# Patient Record
Sex: FEMALE | Race: WHITE | HISPANIC OR LATINO | URBAN - METROPOLITAN AREA
[De-identification: names, ages, dates, MRNs, and addresses within clinical notes are randomized per-mention and may not be internally consistent; named-entity substitution may affect disease eponyms.]

---
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
## 2019-04-26 NOTE — ED PROVIDER NOTE - CARE PLAN
Encounter Date: 4/25/2019    SCRIBE #1 NOTE: I, Daphnie Newby, am scribing for, and in the presence of,  KIERRA Velasco. I have scribed the entire note. Other sections scribed: HPI, ROS, PE.       History     Chief Complaint   Patient presents with    Wound Check     pt has splint to left lower leg placed here yest. Pt reports getting it wet at school and needs it re-wrapped     Dede Foy is a 11 y.o. female who presents to the ED for a splint change. Patient was seen in ED for left foot pain and swelling after she tripped and fell while running 4 days ago. She visited the ED yesterday and x-ray showed suspected acute avulsion type fracture seen at the base of the 5th metatarsal. Splint was applied in ED, but today patient got splint wet at school and needs it re-wrapped.    The history is provided by the patient. No  was used.   General Injury    The incident occurred 3 to 5 hours ago (Foot splint got wet a school). The incident occurred at school. The pain is at a severity of 5/10. Pertinent negatives include no chest pain and no numbness. She has been behaving normally. Recently, medical care has been given at this facility.     Review of patient's allergies indicates:   Allergen Reactions    Amoxicillin Hives    Amoxicillin     Pcn [penicillins]      History reviewed. No pertinent past medical history.  Past Surgical History:   Procedure Laterality Date    TONSILLECTOMY-ADENOIDECTOMY (T AND A) Bilateral 8/29/2016    Performed by Mare Hastings MD at [facility] OR [address] [place] [address]     History reviewed. No pertinent family history.  Social History     Tobacco Use    Smoking status: Never Smoker    Smokeless tobacco: Never Used   Substance Use Topics    Alcohol use: No    Drug use: No     Review of Systems   Constitutional: Negative.    HENT: Negative.    Eyes: Negative.    Respiratory: Negative.  Negative for shortness of breath.    Cardiovascular: Negative.  Negative for chest pain.    Gastrointestinal: Negative.    Endocrine: Negative.    Genitourinary: Negative.    Musculoskeletal: Positive for arthralgias (L foot) and joint swelling (L foot).        L foot splinted   Allergic/Immunologic: Negative.    Neurological: Negative.  Negative for numbness.   Hematological: Negative.    Psychiatric/Behavioral: Negative.    All other systems reviewed and are negative.      Physical Exam     Initial Vitals [04/25/19 1758]   BP Pulse Resp Temp SpO2   121/66 95 20 98.9 °F (37.2 °C) 100 %      MAP       --         Physical Exam    Nursing note and vitals reviewed.  Constitutional: She appears well-developed.   HENT:   Head: Normocephalic and atraumatic.   Right Ear: External ear normal.   Left Ear: External ear normal.   Eyes: Conjunctivae are normal.   Neck: Normal range of motion. Neck supple.   Cardiovascular: Normal rate, regular rhythm, normal heart sounds and intact distal pulses.   Pulmonary/Chest: Breath sounds normal. No respiratory distress.   Abdominal: Soft.   Neurological: She is alert and oriented to person, place, and time. GCS score is 15.   Skin: Skin is warm.   Psychiatric: She has a normal mood and affect.         ED Course   Splint Application  Date/Time: 4/25/2019 7:30 PM  Performed by: KIERRA Garcia  Authorized by: Edita Richmond MD   Location details: left leg  Splint type: short leg  Supplies used: Ortho-Glass  Post-procedure: The splinted body part was neurovascularly unchanged following the procedure.  Patient tolerance: Patient tolerated the procedure well with no immediate complications        Labs Reviewed - No data to display       Imaging Results    None          Medical Decision Making:   Initial Assessment:   This is a 11 y.o. nontoxic female who presents to the ED for wound check of L foot s/p splint application yesterday at ED.  Differential Diagnosis:   Splint change  ED Management:  Short-leg splint applied.  Follow-up with ortho as scheduled.  Motrin as needed for  Principal Discharge DX:	Nausea vomiting and diarrhea pain .            Scribe Attestation:   Scribe #1: I performed the above scribed service and the documentation accurately describes the services I performed. I attest to the accuracy of the note.    This document was produced by a scribe under my direction and in my presence. I agree with the content of the note and have made any necessary edits.     KIERRA Velasco    04/27/2019 12:48 PM        ED Course as of Apr 25 2020   Thu Apr 25, 2019   1741 A 11-year-old female who presents to the ED wound for a wound recheck.  Patient had a splint placed here and the splint got wet at school.  Mother is requesting a splint change.    [TA]      ED Course User Index  [TA] KIERRA Garcia     Clinical Impression:     1. Closed fracture of left foot with routine healing, subsequent encounter                                   KIERRA Garcia  04/27/19 1241     Principal Discharge DX:	Nausea vomiting and diarrhea  Instructions for follow-up, activity and diet:	You were seen today for your nausea, vomiting, and diarrhea.  It is likely due to a viral illness.  Eat a bland diet such as bananas, rice, applesauce, and toast until you feel better.  Drink plenty of fluids.  Take the prescribed nausea medication as needed.  Be sure to follow up with your primary care physician in 2-3 days for re-evaluation.  RETURN TO THE EMERGENCY DEPARTMENT IMMEDIATELY IF YOU CANNOT EAT OR DRINK, HAVE SEVERE ABDOMINAL PAIN, OR FOR ANY OTHER CONCERN.

## 2021-06-23 ENCOUNTER — EMERGENCY (EMERGENCY)
Facility: HOSPITAL | Age: 21
LOS: 1 days | Discharge: HOME | End: 2021-06-23
Attending: EMERGENCY MEDICINE | Admitting: HOSPITALIST
Payer: COMMERCIAL

## 2021-06-23 VITALS
DIASTOLIC BLOOD PRESSURE: 61 MMHG | OXYGEN SATURATION: 100 % | HEART RATE: 72 BPM | TEMPERATURE: 98 F | RESPIRATION RATE: 17 BRPM | SYSTOLIC BLOOD PRESSURE: 97 MMHG

## 2021-06-23 VITALS
HEART RATE: 60 BPM | SYSTOLIC BLOOD PRESSURE: 115 MMHG | OXYGEN SATURATION: 100 % | DIASTOLIC BLOOD PRESSURE: 74 MMHG | RESPIRATION RATE: 18 BRPM | TEMPERATURE: 98 F

## 2021-06-23 LAB
ALBUMIN SERPL ELPH-MCNC: 4.9 G/DL — SIGNIFICANT CHANGE UP (ref 3.3–5)
ALBUMIN SERPL ELPH-MCNC: 4.9 G/DL — SIGNIFICANT CHANGE UP (ref 3.3–5)
ALP SERPL-CCNC: 73 U/L — SIGNIFICANT CHANGE UP (ref 40–120)
ALP SERPL-CCNC: 79 U/L — SIGNIFICANT CHANGE UP (ref 40–120)
ALT FLD-CCNC: 11 U/L — SIGNIFICANT CHANGE UP (ref 4–33)
ALT FLD-CCNC: 12 U/L — SIGNIFICANT CHANGE UP (ref 4–33)
ANION GAP SERPL CALC-SCNC: 11 MMOL/L — SIGNIFICANT CHANGE UP (ref 7–14)
ANION GAP SERPL CALC-SCNC: 19 MMOL/L — HIGH (ref 7–14)
APPEARANCE UR: CLEAR — SIGNIFICANT CHANGE UP
AST SERPL-CCNC: 20 U/L — SIGNIFICANT CHANGE UP (ref 4–32)
AST SERPL-CCNC: 21 U/L — SIGNIFICANT CHANGE UP (ref 4–32)
BASOPHILS # BLD AUTO: 0.04 K/UL — SIGNIFICANT CHANGE UP (ref 0–0.2)
BASOPHILS # BLD AUTO: 0.06 K/UL — SIGNIFICANT CHANGE UP (ref 0–0.2)
BASOPHILS NFR BLD AUTO: 0.3 % — SIGNIFICANT CHANGE UP (ref 0–2)
BASOPHILS NFR BLD AUTO: 0.4 % — SIGNIFICANT CHANGE UP (ref 0–2)
BILIRUB SERPL-MCNC: 0.6 MG/DL — SIGNIFICANT CHANGE UP (ref 0.2–1.2)
BILIRUB SERPL-MCNC: 0.6 MG/DL — SIGNIFICANT CHANGE UP (ref 0.2–1.2)
BILIRUB UR-MCNC: NEGATIVE — SIGNIFICANT CHANGE UP
BUN SERPL-MCNC: 15 MG/DL — SIGNIFICANT CHANGE UP (ref 7–23)
BUN SERPL-MCNC: 9 MG/DL — SIGNIFICANT CHANGE UP (ref 7–23)
CALCIUM SERPL-MCNC: 10.1 MG/DL — SIGNIFICANT CHANGE UP (ref 8.4–10.5)
CALCIUM SERPL-MCNC: 9.5 MG/DL — SIGNIFICANT CHANGE UP (ref 8.4–10.5)
CHLORIDE SERPL-SCNC: 103 MMOL/L — SIGNIFICANT CHANGE UP (ref 98–107)
CHLORIDE SERPL-SCNC: 107 MMOL/L — SIGNIFICANT CHANGE UP (ref 98–107)
CO2 SERPL-SCNC: 19 MMOL/L — LOW (ref 22–31)
CO2 SERPL-SCNC: 24 MMOL/L — SIGNIFICANT CHANGE UP (ref 22–31)
COLOR SPEC: YELLOW — SIGNIFICANT CHANGE UP
CREAT SERPL-MCNC: 0.63 MG/DL — SIGNIFICANT CHANGE UP (ref 0.5–1.3)
CREAT SERPL-MCNC: 0.63 MG/DL — SIGNIFICANT CHANGE UP (ref 0.5–1.3)
DIFF PNL FLD: ABNORMAL
EOSINOPHIL # BLD AUTO: 0.01 K/UL — SIGNIFICANT CHANGE UP (ref 0–0.5)
EOSINOPHIL # BLD AUTO: 0.05 K/UL — SIGNIFICANT CHANGE UP (ref 0–0.5)
EOSINOPHIL NFR BLD AUTO: 0.1 % — SIGNIFICANT CHANGE UP (ref 0–6)
EOSINOPHIL NFR BLD AUTO: 0.3 % — SIGNIFICANT CHANGE UP (ref 0–6)
GLUCOSE SERPL-MCNC: 108 MG/DL — HIGH (ref 70–99)
GLUCOSE SERPL-MCNC: 195 MG/DL — HIGH (ref 70–99)
GLUCOSE UR QL: ABNORMAL
HCG SERPL-ACNC: <5 MIU/ML — SIGNIFICANT CHANGE UP
HCT VFR BLD CALC: 38.9 % — SIGNIFICANT CHANGE UP (ref 34.5–45)
HCT VFR BLD CALC: 42.9 % — SIGNIFICANT CHANGE UP (ref 34.5–45)
HGB BLD-MCNC: 13.2 G/DL — SIGNIFICANT CHANGE UP (ref 11.5–15.5)
HGB BLD-MCNC: 14.1 G/DL — SIGNIFICANT CHANGE UP (ref 11.5–15.5)
IANC: 11.6 K/UL — HIGH (ref 1.5–8.5)
IANC: 12.34 K/UL — HIGH (ref 1.5–8.5)
IMM GRANULOCYTES NFR BLD AUTO: 0.3 % — SIGNIFICANT CHANGE UP (ref 0–1.5)
IMM GRANULOCYTES NFR BLD AUTO: 0.3 % — SIGNIFICANT CHANGE UP (ref 0–1.5)
KETONES UR-MCNC: ABNORMAL
LEUKOCYTE ESTERASE UR-ACNC: NEGATIVE — SIGNIFICANT CHANGE UP
LYMPHOCYTES # BLD AUTO: 1.49 K/UL — SIGNIFICANT CHANGE UP (ref 1–3.3)
LYMPHOCYTES # BLD AUTO: 1.76 K/UL — SIGNIFICANT CHANGE UP (ref 1–3.3)
LYMPHOCYTES # BLD AUTO: 10.3 % — LOW (ref 13–44)
LYMPHOCYTES # BLD AUTO: 12.2 % — LOW (ref 13–44)
MCHC RBC-ENTMCNC: 29.3 PG — SIGNIFICANT CHANGE UP (ref 27–34)
MCHC RBC-ENTMCNC: 29.9 PG — SIGNIFICANT CHANGE UP (ref 27–34)
MCHC RBC-ENTMCNC: 32.9 GM/DL — SIGNIFICANT CHANGE UP (ref 32–36)
MCHC RBC-ENTMCNC: 33.9 GM/DL — SIGNIFICANT CHANGE UP (ref 32–36)
MCV RBC AUTO: 88 FL — SIGNIFICANT CHANGE UP (ref 80–100)
MCV RBC AUTO: 89.2 FL — SIGNIFICANT CHANGE UP (ref 80–100)
MONOCYTES # BLD AUTO: 0.52 K/UL — SIGNIFICANT CHANGE UP (ref 0–0.9)
MONOCYTES # BLD AUTO: 0.96 K/UL — HIGH (ref 0–0.9)
MONOCYTES NFR BLD AUTO: 3.6 % — SIGNIFICANT CHANGE UP (ref 2–14)
MONOCYTES NFR BLD AUTO: 6.7 % — SIGNIFICANT CHANGE UP (ref 2–14)
NEUTROPHILS # BLD AUTO: 11.6 K/UL — HIGH (ref 1.8–7.4)
NEUTROPHILS # BLD AUTO: 12.34 K/UL — HIGH (ref 1.8–7.4)
NEUTROPHILS NFR BLD AUTO: 80.4 % — HIGH (ref 43–77)
NEUTROPHILS NFR BLD AUTO: 85.1 % — HIGH (ref 43–77)
NITRITE UR-MCNC: NEGATIVE — SIGNIFICANT CHANGE UP
NRBC # BLD: 0 /100 WBCS — SIGNIFICANT CHANGE UP
NRBC # BLD: 0 /100 WBCS — SIGNIFICANT CHANGE UP
NRBC # FLD: 0 K/UL — SIGNIFICANT CHANGE UP
NRBC # FLD: 0 K/UL — SIGNIFICANT CHANGE UP
PH UR: 6 — SIGNIFICANT CHANGE UP (ref 5–8)
PLATELET # BLD AUTO: 227 K/UL — SIGNIFICANT CHANGE UP (ref 150–400)
PLATELET # BLD AUTO: 246 K/UL — SIGNIFICANT CHANGE UP (ref 150–400)
POTASSIUM SERPL-MCNC: 2.6 MMOL/L — CRITICAL LOW (ref 3.5–5.3)
POTASSIUM SERPL-MCNC: 3.5 MMOL/L — SIGNIFICANT CHANGE UP (ref 3.5–5.3)
POTASSIUM SERPL-SCNC: 2.6 MMOL/L — CRITICAL LOW (ref 3.5–5.3)
POTASSIUM SERPL-SCNC: 3.5 MMOL/L — SIGNIFICANT CHANGE UP (ref 3.5–5.3)
PROT SERPL-MCNC: 7.6 G/DL — SIGNIFICANT CHANGE UP (ref 6–8.3)
PROT SERPL-MCNC: 7.9 G/DL — SIGNIFICANT CHANGE UP (ref 6–8.3)
PROT UR-MCNC: ABNORMAL
RBC # BLD: 4.42 M/UL — SIGNIFICANT CHANGE UP (ref 3.8–5.2)
RBC # BLD: 4.81 M/UL — SIGNIFICANT CHANGE UP (ref 3.8–5.2)
RBC # FLD: 13.2 % — SIGNIFICANT CHANGE UP (ref 10.3–14.5)
RBC # FLD: 13.2 % — SIGNIFICANT CHANGE UP (ref 10.3–14.5)
SARS-COV-2 RNA SPEC QL NAA+PROBE: SIGNIFICANT CHANGE UP
SODIUM SERPL-SCNC: 141 MMOL/L — SIGNIFICANT CHANGE UP (ref 135–145)
SODIUM SERPL-SCNC: 142 MMOL/L — SIGNIFICANT CHANGE UP (ref 135–145)
SP GR SPEC: 1.03 — HIGH (ref 1.01–1.02)
UROBILINOGEN FLD QL: SIGNIFICANT CHANGE UP
WBC # BLD: 14.41 K/UL — HIGH (ref 3.8–10.5)
WBC # BLD: 14.51 K/UL — HIGH (ref 3.8–10.5)
WBC # FLD AUTO: 14.41 K/UL — HIGH (ref 3.8–10.5)
WBC # FLD AUTO: 14.51 K/UL — HIGH (ref 3.8–10.5)

## 2021-06-23 PROCEDURE — 99236 HOSP IP/OBS SAME DATE HI 85: CPT

## 2021-06-23 RX ORDER — SODIUM CHLORIDE 9 MG/ML
1000 INJECTION INTRAMUSCULAR; INTRAVENOUS; SUBCUTANEOUS ONCE
Refills: 0 | Status: COMPLETED | OUTPATIENT
Start: 2021-06-23 | End: 2021-06-23

## 2021-06-23 RX ORDER — ONDANSETRON 8 MG/1
4 TABLET, FILM COATED ORAL ONCE
Refills: 0 | Status: COMPLETED | OUTPATIENT
Start: 2021-06-23 | End: 2021-06-23

## 2021-06-23 RX ORDER — POTASSIUM CHLORIDE 20 MEQ
10 PACKET (EA) ORAL
Refills: 0 | Status: COMPLETED | OUTPATIENT
Start: 2021-06-23 | End: 2021-06-23

## 2021-06-23 RX ORDER — METOCLOPRAMIDE HCL 10 MG
10 TABLET ORAL ONCE
Refills: 0 | Status: COMPLETED | OUTPATIENT
Start: 2021-06-23 | End: 2021-06-23

## 2021-06-23 RX ORDER — SODIUM CHLORIDE 9 MG/ML
1000 INJECTION INTRAMUSCULAR; INTRAVENOUS; SUBCUTANEOUS
Refills: 0 | Status: DISCONTINUED | OUTPATIENT
Start: 2021-06-23 | End: 2021-06-26

## 2021-06-23 RX ORDER — ONDANSETRON 8 MG/1
1 TABLET, FILM COATED ORAL
Qty: 12 | Refills: 0
Start: 2021-06-23 | End: 2021-06-26

## 2021-06-23 RX ORDER — SODIUM CHLORIDE 9 MG/ML
100 INJECTION INTRAMUSCULAR; INTRAVENOUS; SUBCUTANEOUS ONCE
Refills: 0 | Status: DISCONTINUED | OUTPATIENT
Start: 2021-06-23 | End: 2021-06-23

## 2021-06-23 RX ADMIN — SODIUM CHLORIDE 1000 MILLILITER(S): 9 INJECTION INTRAMUSCULAR; INTRAVENOUS; SUBCUTANEOUS at 10:14

## 2021-06-23 RX ADMIN — Medication 100 MILLIEQUIVALENT(S): at 10:26

## 2021-06-23 RX ADMIN — ONDANSETRON 4 MILLIGRAM(S): 8 TABLET, FILM COATED ORAL at 08:23

## 2021-06-23 RX ADMIN — Medication 10 MILLIEQUIVALENT(S): at 11:50

## 2021-06-23 RX ADMIN — SODIUM CHLORIDE 100 MILLILITER(S): 9 INJECTION INTRAMUSCULAR; INTRAVENOUS; SUBCUTANEOUS at 11:30

## 2021-06-23 RX ADMIN — Medication 10 MILLIGRAM(S): at 09:40

## 2021-06-23 RX ADMIN — Medication 100 MILLIEQUIVALENT(S): at 13:13

## 2021-06-23 RX ADMIN — SODIUM CHLORIDE 1000 MILLILITER(S): 9 INJECTION INTRAMUSCULAR; INTRAVENOUS; SUBCUTANEOUS at 16:41

## 2021-06-23 RX ADMIN — SODIUM CHLORIDE 100 MILLILITER(S): 9 INJECTION INTRAMUSCULAR; INTRAVENOUS; SUBCUTANEOUS at 16:41

## 2021-06-23 RX ADMIN — SODIUM CHLORIDE 1000 MILLILITER(S): 9 INJECTION INTRAMUSCULAR; INTRAVENOUS; SUBCUTANEOUS at 08:25

## 2021-06-23 RX ADMIN — Medication 100 MILLIEQUIVALENT(S): at 11:51

## 2021-06-23 NOTE — ED PROVIDER NOTE - PHYSICAL EXAMINATION
Gen: Awake, Alert, WD, WN, NAD  Head:  NC/AT  Eyes:  PERRL, EOMI, Conjunctiva pink, lids normal, no scleral icterus  Neck: supple, nontender, no meningismus, no JVD, trachea midline  Cardiac/CV:  S1 S2, RRR, no M/G/R  Respiratory/Pulm:  CTAB, good air movement, normal resp effort, no wheezes/stridor/retractions/rales/rhonchi  Gastrointestinal/Abdomen:  Soft, nontender, nondistended, +BS, no rebound/guarding  Back:  no CVAT, no MLT  Ext:  warm, well perfused, moving all extremities spontaneously, no peripheral edema, distal pulses intact  Skin: intact, no rash  Neuro:  AAOx3, sensation intact, motor 5/5 x 4 extremities, speech clear

## 2021-06-23 NOTE — ED CDU PROVIDER INITIAL DAY NOTE - ATTENDING CONTRIBUTION TO CARE
Attending Statement: I have reviewed and agree with all pertinent clinical information, including history and physical exam and agree with treatment plan of the PA, except as noted.  19yo F no sig pmhx presented with NBNB vomit and cramps. Pt on menstrual cycle currently, endorse same pain and nausea each month. no fever or chills no dysuria. no cp or sob Vital signs noted. mmm. soft nontender abdomen. no  rebound. no guarding. no sign of trauma. no CVAT   plan placed in CDU for IVF and antiementic. Replete K. repeat bmp.

## 2021-06-23 NOTE — ED PROVIDER NOTE - ATTENDING CONTRIBUTION TO CARE
Attending Statement: I have personally seen and examined this patient. I have fully participated in the care of this patient. I have reviewed all pertinent clinical information, including history physical exam, plan and the fellow note and agree except as noted  19yo F no sig pmhx pw nausea and vomit x 2 days. NBNB vomit no diarrhea. States just started her menstrual cycle, typically have same symptoms w menses. Endorse lower cramps "just a period" no back pain. no fever. no dysuria.   Vital signs noted. mmm. non icteric. no work of breathing. thin  female. non distended mild lower mid tenderness. no cvat  plan labs, urine, IVF, pain med and anti emetic. Attending Statement: I have personally seen and examined this patient. I have fully participated in the care of this patient. I have reviewed all pertinent clinical information, including history physical exam, plan and the fellow note and agree except as noted  21yo F no sig pmhx pw nausea and vomit x 2 days. NBNB vomit no diarrhea. States just started her menstrual cycle, typically have same symptoms w menses. Endorse lower cramps "just a period" no back pain. no fever. no dysuria.   Vital signs noted. mmm. non icteric. no work of breathing. thin  female. non distended mild lower mid tenderness. no cvat ambulatory wo difficulty   plan labs, urine, IVF, pain med and anti emetic.

## 2021-06-23 NOTE — ED ADULT NURSE REASSESSMENT NOTE - NS ED NURSE REASSESS COMMENT FT1
Pt. reports improvement in nausea and vomiting. Placed on cardiac monitor. Potassium running as ordered. Will continue to monitor.

## 2021-06-23 NOTE — ED CDU PROVIDER DISPOSITION NOTE - PATIENT PORTAL LINK FT
You can access the FollowMyHealth Patient Portal offered by [facility] by registering at the following website: [email or web address]. By joining [employer]’s FollowMyHealth portal, you will also be able to view your health information using other applications (apps) compatible with our system.

## 2021-06-23 NOTE — ED ADULT TRIAGE NOTE - CHIEF COMPLAINT QUOTE
pt c/o n/v since early this morning. Pt currently menstruating and states she often gets n/v with menstruation. Pt endorses pelvic cramping and diarrhea. Pt actively vomiting in triage, appears pale. Pt took 8 mg zofran sublingual @6am.

## 2021-06-23 NOTE — ED PROVIDER NOTE - OBJECTIVE STATEMENT
19yo F p/w n/v/d x2 days associated with onset of her menstrual cycle. Patient has had similar episodes with her menstrual cycle previously. She endorses nbnb vomiting with all oral intake, diarrhea, and abdominal cramping that is normal for her cycles. She denies fever, urinary symptoms, flank pain, melena/hematochezia, vaginal discharge, cp/sob, abdominal distension, or further symptoms. She does endorse smoking marijuana a few days ago, and smokes every few days without symptoms in the past reportedly. She took 8mg of sublingual zofran prior to arrival in the ED but immediately vomited the medicines at home.

## 2021-06-23 NOTE — ED CDU PROVIDER DISPOSITION NOTE - CLINICAL COURSE
Agree with above note. 21 yo F occassional marijuana smoker, here with abdominal cramping, NBNB vomiting with nausea, and diarrhea. that has occurred in past with her menstrual cycle and she started menstrual cycle 2 days ago. IN ER. labs WBC 14 K 2.6, large Ketone in urine and blood ( likely consistent with menses.) CDU for IVhydration, antiemetics as needed, Potassium repletion rpt labs showing K 3.5, patient feels much improved. vitals stable, patient noting h/o lower bp. no complaints. Patient would like to be discharged home. Tolerated multiple meals in CDU.

## 2021-06-23 NOTE — ED CDU PROVIDER INITIAL DAY NOTE - OBJECTIVE STATEMENT
21yo F p/w n/v/d x2 days associated with onset of her menstrual cycle. Patient has had similar episodes with her menstrual cycle previously. She endorses nbnb vomiting with all oral intake, diarrhea, and abdominal cramping that is normal for her cycles. She denies fever, urinary symptoms, flank pain, melena/hematochezia, vaginal discharge, cp/sob, abdominal distension, or further symptoms. She does endorse smoking marijuana a few days ago, and smokes every few days without symptoms in the past reportedly. She took 8mg of sublingual zofran prior to arrival in the ED but immediately vomited the medicines at home.    CDU TOY Collier: Agree with above note. 19 yo F occassional marijuana smoker, here with abdominal cramping, NBNB vomiting with nausea, and diarrhea. that has occurred in past with her menstrual cycle and she started menstrual cycle 2 days ago. IN ER. labs WBC 14 K 2.6, large Ketone in urine and blood ( likely consistent with menses.) CDU for IVhydration, antiemetics as needed, Potassium repletion and rpt labs.

## 2021-06-23 NOTE — ED ADULT NURSE NOTE - SUICIDE SCREENING QUESTION 3
Call placed to Abdias.  Informed him that Vikki wants to wait until Jacquelyn gets back in the office to address this issue.  Patient states, \"i'm taking 3 tramadol per day to help with the lower patch dose until Jacquelyn can gets back, so if you could let her know.  And that seems to be working well with only a little discomfort.  But my wife isn't sure that the Tramadol is a long term solution\".    Message routed to Jacquelyn to address upon her return.    Jacquelyn, please see messages below regarding Butrans patch dose.   No

## 2021-06-23 NOTE — ED CDU PROVIDER DISPOSITION NOTE - NSFOLLOWUPINSTRUCTIONS_ED_ALL_ED_FT
Follow up with your Primary Medical Doctor within 2-3days. If results or reports were given to you, show copies of your reports given to you. Take all of your medications as previously prescribed.    If you have issues obtaining follow up, please call: [phone] to obtain a doctor or specialist who takes your insurance in your area.    Follow with OBGYN.     If any new, concerning, worsening symptoms return to the ER.

## 2021-06-23 NOTE — ED ADULT NURSE NOTE - OBJECTIVE STATEMENT
Pt. received in room 4, A/O x4, ambulatory, c/o nausea and vomiting for 2 days. No PMH. Denies fever, SOB, chest pain and headache. Pt. states" My stomach hurts and I haven't been able to keep food down for 2 days." Appears pale, cold and is actively vomiting. Abdomen is flat, soft and nontender to palpation. 20 gauge IV placed in right AC. Medicated as ordered. Will continue to monitor.

## 2021-06-23 NOTE — ED CDU PROVIDER INITIAL DAY NOTE - MEDICAL DECISION MAKING DETAILS
19 yo F occassional marijuana smoker, here with abdominal cramping, NBNB vomiting with nausea, and diarrhea. that has occurred in past with her menstrual cycle and she started menstrual cycle 2 days ago. IN ER. labs WBC 14 K 2.6, large Ketone in urine and blood ( likely consistent with menses.) CDU for IVhydration, antiemetics as needed, Potassium repletion and rpt labs.

## 2021-06-23 NOTE — ED PROVIDER NOTE - CLINICAL SUMMARY MEDICAL DECISION MAKING FREE TEXT BOX
Pt p/w n/v/d consistent with prior episodes associated with her menstrual cycle. denies urinary symptoms, fever, vaginal discharge. LMP started 2 days ago. labs, ua, hcg, pending. IVF and zofran given. differential includes emesis 2/2 menstrual cycle, pregnancy, cannabis use (last used a few days ago). Likely discharge home if workup benign and +po.

## 2021-06-23 NOTE — ED CDU PROVIDER DISPOSITION NOTE - ATTENDING CONTRIBUTION TO CARE
20F with abdominal pain and vomiting a/w her menses. placed in CDU for antiemetics and po chall. patient feeling better, wishes to go home. will dc home.

## 2021-06-23 NOTE — ED CDU PROVIDER INITIAL DAY NOTE - FAMILY HISTORY
Grandparent  Still living? Unknown  Family history of cancer in maternal grandmother, Age at diagnosis: Age Unknown

## 2021-06-25 LAB
CULTURE RESULTS: SIGNIFICANT CHANGE UP
SPECIMEN SOURCE: SIGNIFICANT CHANGE UP